# Patient Record
Sex: MALE | Race: ASIAN | NOT HISPANIC OR LATINO | URBAN - METROPOLITAN AREA
[De-identification: names, ages, dates, MRNs, and addresses within clinical notes are randomized per-mention and may not be internally consistent; named-entity substitution may affect disease eponyms.]

---

## 2024-09-30 ENCOUNTER — OFFICE VISIT (OUTPATIENT)
Dept: URBAN - METROPOLITAN AREA CLINIC 98 | Facility: CLINIC | Age: 29
End: 2024-09-30

## 2024-12-16 ENCOUNTER — DASHBOARD ENCOUNTERS (OUTPATIENT)
Age: 29
End: 2024-12-16

## 2024-12-17 ENCOUNTER — OFFICE VISIT (OUTPATIENT)
Dept: URBAN - METROPOLITAN AREA CLINIC 98 | Facility: CLINIC | Age: 29
End: 2024-12-17

## 2025-02-04 ENCOUNTER — LAB OUTSIDE AN ENCOUNTER (OUTPATIENT)
Dept: URBAN - METROPOLITAN AREA CLINIC 98 | Facility: CLINIC | Age: 30
End: 2025-02-04

## 2025-02-04 ENCOUNTER — OFFICE VISIT (OUTPATIENT)
Dept: URBAN - METROPOLITAN AREA CLINIC 98 | Facility: CLINIC | Age: 30
End: 2025-02-04
Payer: COMMERCIAL

## 2025-02-04 VITALS
HEART RATE: 86 BPM | SYSTOLIC BLOOD PRESSURE: 98 MMHG | DIASTOLIC BLOOD PRESSURE: 77 MMHG | BODY MASS INDEX: 23.28 KG/M2 | HEIGHT: 69 IN | TEMPERATURE: 98 F | WEIGHT: 157.2 LBS

## 2025-02-04 DIAGNOSIS — R19.4 CHANGE IN BOWEL HABITS: ICD-10-CM

## 2025-02-04 PROCEDURE — 99203 OFFICE O/P NEW LOW 30 MIN: CPT | Performed by: INTERNAL MEDICINE

## 2025-02-04 NOTE — HPI-TODAY'S VISIT:
Since he was young, he was told by his mother to see GI  stool is always diarrhea - changing diet didn't help, fiber didn't help  usually loose stools , up to 5 bm / day  started experiencing urgency 12yo ; 2014 more diarrhea  gets bloated and gassy after eating  . no PCP

## 2025-02-06 ENCOUNTER — LAB OUTSIDE AN ENCOUNTER (OUTPATIENT)
Dept: URBAN - METROPOLITAN AREA CLINIC 98 | Facility: CLINIC | Age: 30
End: 2025-02-06

## 2025-02-06 ENCOUNTER — TELEPHONE ENCOUNTER (OUTPATIENT)
Dept: URBAN - METROPOLITAN AREA CLINIC 98 | Facility: CLINIC | Age: 30
End: 2025-02-06

## 2025-02-06 LAB
ALBUMIN: 4.9
ALKALINE PHOSPHATASE: 83
ALT (SGPT): 56
AST (SGOT): 82
BASO (ABSOLUTE): 0
BASOS: 1
BILIRUBIN, TOTAL: 0.9
BUN/CREATININE RATIO: 13
BUN: 14
CALCIUM: 10.6
CARBON DIOXIDE, TOTAL: 26
CHLORIDE: 96
CREATININE: 1.07
DEAMIDATED GLIADIN ABS, IGA: 5
DEAMIDATED GLIADIN ABS, IGG: 4
EGFR: 97
ENDOMYSIAL ANTIBODY IGA: NEGATIVE
EOS (ABSOLUTE): 0.2
EOS: 2
GLOBULIN, TOTAL: 3.2
GLUCOSE: 96
HBSAG SCREEN: NEGATIVE
HCV AB: NON REACTIVE
HEMATOCRIT: 56.9
HEMATOLOGY COMMENTS:: (no result)
HEMOGLOBIN: 19.2
HEP A AB, TOTAL: POSITIVE
HEP B CORE AB, TOT: NEGATIVE
HEPATITIS B SURF AB QUANT: 13.1
IMMATURE CELLS: (no result)
IMMATURE GRANS (ABS): 0.1
IMMATURE GRANULOCYTES: 1
IMMUNOGLOBULIN A, QN, SERUM: 231
INTERPRETATION:: (no result)
LYMPHS (ABSOLUTE): 2.1
LYMPHS: 31
MCH: 31.6
MCHC: 33.7
MCV: 94
MONOCYTES(ABSOLUTE): 0.5
MONOCYTES: 7
NEUTROPHILS (ABSOLUTE): 3.8
NEUTROPHILS: 58
NRBC: (no result)
PLATELETS: 346
POTASSIUM: 4.7
PROTEIN, TOTAL: 8.1
RBC: 6.08
RDW: 13.6
SODIUM: 140
T-TRANSGLUTAMINASE (TTG) IGA: <2
T-TRANSGLUTAMINASE (TTG) IGG: 6
T4,FREE(DIRECT): 1.22
TSH: 4.53
WBC: 6.6

## 2025-03-02 ENCOUNTER — WEB ENCOUNTER (OUTPATIENT)
Dept: URBAN - METROPOLITAN AREA CLINIC 98 | Facility: CLINIC | Age: 30
End: 2025-03-02

## 2025-03-02 ENCOUNTER — TELEPHONE ENCOUNTER (OUTPATIENT)
Dept: URBAN - METROPOLITAN AREA CLINIC 98 | Facility: CLINIC | Age: 30
End: 2025-03-02

## 2025-03-02 NOTE — HPI-TODAY'S VISIT:
[place]  2/2025 \*Unknown; EXAM: US ABDOMEN COMPLETE W/ELASTOGRAPHY \~ CLINICAL INDICATION: abnormal levels of other serum enzymes. \~ TECHNIQUE: Axial and longitudinal images were obtained of the upper abdomen using real-time ultrasound. Elastography measurements were obtained. [id number] \~ COMPARISON: 10/1/2019, CT Abdomen + Pelvis w/o IV Contrast \~ FINDINGS:  Liver: Normal echogenicity. No lesions. Portal vein is patent. Hepatic veins are patent. \~ Bile Ducts: No dilated intrahepatic biliary radicles. Common bile duct measures 2 mm. \~ Gallbladder: Normal. Mehta's sign is negative. \~ Pancreas: Normal. \~ Right Kidney: Measures 10.3 cm. Normal echogenicity. No hydronephrosis. \~ Left Kidney: Measures 11.1 cm. Normal echogenicity. No hydronephrosis. \~ Spleen: Measures 8.4 cm. \~ Aorta: Normal caliber where imaged. \~ IVC: Normal proximally, not imaged distally.  \~ Other: None. \~ Shear wave elastography based on 12 measurements:  Mean: 2.76 kPa Median: 2.73 kPa Standard deviation: 0.25 kPa \~ IMPRESSION: 1.  No ultrasound evidence of significant fibrosis, based on Elastography measurements. 2.  No suspicious liver lesions identified on ultrasound.

## 2025-06-23 ENCOUNTER — OFFICE VISIT (OUTPATIENT)
Dept: URBAN - METROPOLITAN AREA SURGERY CENTER 18 | Facility: SURGERY CENTER | Age: 30
End: 2025-06-23

## 2025-06-23 ENCOUNTER — CLAIMS CREATED FROM THE CLAIM WINDOW (OUTPATIENT)
Dept: URBAN - METROPOLITAN AREA CLINIC 4 | Facility: CLINIC | Age: 30
End: 2025-06-23
Payer: COMMERCIAL

## 2025-06-23 DIAGNOSIS — K63.89 OTHER SPECIFIED DISEASES OF INTESTINE: ICD-10-CM

## 2025-06-23 PROCEDURE — 88305 TISSUE EXAM BY PATHOLOGIST: CPT | Performed by: PATHOLOGY

## 2025-06-23 PROCEDURE — 88342 IMHCHEM/IMCYTCHM 1ST ANTB: CPT | Performed by: PATHOLOGY

## 2025-06-23 PROCEDURE — 88313 SPECIAL STAINS GROUP 2: CPT | Performed by: PATHOLOGY

## 2025-07-02 ENCOUNTER — TELEPHONE ENCOUNTER (OUTPATIENT)
Dept: URBAN - METROPOLITAN AREA CLINIC 98 | Facility: CLINIC | Age: 30
End: 2025-07-02

## 2025-07-25 ENCOUNTER — OFFICE VISIT (OUTPATIENT)
Dept: URBAN - METROPOLITAN AREA CLINIC 98 | Facility: CLINIC | Age: 30
End: 2025-07-25
Payer: COMMERCIAL

## 2025-07-25 DIAGNOSIS — K58.0 IRRITABLE BOWEL SYNDROME WITH DIARRHEA: ICD-10-CM

## 2025-07-25 DIAGNOSIS — D58.2 ELEVATED HEMOGLOBIN: ICD-10-CM

## 2025-07-25 DIAGNOSIS — R74.8 ELEVATED LIVER ENZYMES: ICD-10-CM

## 2025-07-25 PROCEDURE — 99213 OFFICE O/P EST LOW 20 MIN: CPT

## 2025-07-25 NOTE — HPI-OTHER HISTORIES
2/4/25- Dr. Ruvalcaba Since he was young, he was told by his mother to see GI stool is always diarrhea - changing diet didn't help, fiber didn't help usually loose stools , up to 5 bm / day started experiencing urgency 12yo ; 2014 more diarrhea gets bloated and gassy after eating . no PCP

## 2025-07-25 NOTE — HPI-TODAY'S VISIT:
7/25/2025 Ana Fleming NP -29-year-old male here for scheduled follow-up IBS - No PCP -Last visit with Dr. Spicer 2/4/2025:Labs thyroid panel normal celiac test with only a week positive signal no cause for change in bowel habits proceed with colonoscopy no sign of hepatitis infection hepatitis A and B immune slight elevation in liver function test  - Loose stools entire life - Previously BM 6-8 all loose - Made diet changes decrease alcohol, dairy - BM now 3-4- range watery to soft - Denies abdominal pain - Bright red blood on tissue sometimes - No n/v - previously drinking 2-3 beers per day; now 3 per week - hx. of eczema  Reviewed -6/23/2025 colonoscopy ([facility]); normal-appearing terminal ileum and colon both biopsied.  Small internal hemorrhoids noted.   Pathology: Terminal ileum biopsies-no significant abnormality.  Random colon biopsies-no significant abnormality.  -2/2025 ultrasound of abdomen with elastography Perry: Normal echo Jenness tonicity.  No lesions.  Portal vein is patent.  Hepatic veins are patent.  No dilated intrahepatic biliary ducts.  Common bile duct measures 2 mm.  Normal-appearing gallbladder.  Visualized portion of pancreas normal.  Elastography-2.73K PA-no evidence of liver fibrosis.

## 2025-07-30 LAB
ALBUMIN: 4.7
ALKALINE PHOSPHATASE: 73
ALT (SGPT): 25
AST (SGOT): 34
BASO (ABSOLUTE): 0
BASOS: 1
BILIRUBIN, TOTAL: 0.4
BUN/CREATININE RATIO: 10
BUN: 11
CALCIUM: 9.4
CARBON DIOXIDE, TOTAL: 23
CHLORIDE: 104
CREATININE: 1.06
EGFR: 98
EOS (ABSOLUTE): 0.3
EOS: 5
FERRITIN, SERUM: 392
GLOBULIN, TOTAL: 2.6
GLUCOSE: 89
HEMATOCRIT: 55.5
HEMATOLOGY COMMENTS:: (no result)
HEMOGLOBIN: 18
HEREDITARY  HEMOCHROMATOSIS: (no result)
IMMATURE CELLS: (no result)
IMMATURE GRANS (ABS): 0
IMMATURE GRANULOCYTES: 1
IRON BIND.CAP.(TIBC): 330
IRON SATURATION: 20
IRON: 65
LYMPHS (ABSOLUTE): 2.2
LYMPHS: 36
Lab: (no result)
MCH: 30.8
MCHC: 32.4
MCV: 95
MONOCYTES(ABSOLUTE): 0.4
MONOCYTES: 7
NEUTROPHILS (ABSOLUTE): 3.1
NEUTROPHILS: 50
NRBC: (no result)
PLATELETS: 282
POTASSIUM: 4.5
PROTEIN, TOTAL: 7.3
RBC: 5.84
RDW: 12.5
SODIUM: 145
UIBC: 265
WBC: 6

## 2025-08-06 ENCOUNTER — TELEPHONE ENCOUNTER (OUTPATIENT)
Dept: URBAN - METROPOLITAN AREA CLINIC 3 | Facility: CLINIC | Age: 30
End: 2025-08-06